# Patient Record
Sex: FEMALE | Race: AMERICAN INDIAN OR ALASKA NATIVE
[De-identification: names, ages, dates, MRNs, and addresses within clinical notes are randomized per-mention and may not be internally consistent; named-entity substitution may affect disease eponyms.]

---

## 2017-09-02 ENCOUNTER — HOSPITAL ENCOUNTER (EMERGENCY)
Dept: HOSPITAL 5 - ED | Age: 57
LOS: 1 days | Discharge: HOME | End: 2017-09-03
Payer: MEDICARE

## 2017-09-02 DIAGNOSIS — R07.9: Primary | ICD-10-CM

## 2017-09-02 DIAGNOSIS — R00.2: ICD-10-CM

## 2017-09-02 DIAGNOSIS — J45.909: ICD-10-CM

## 2017-09-02 DIAGNOSIS — E11.9: ICD-10-CM

## 2017-09-02 DIAGNOSIS — E78.00: ICD-10-CM

## 2017-09-02 LAB
ANION GAP SERPL CALC-SCNC: 19 MMOL/L
BASOPHILS NFR BLD AUTO: 0.4 % (ref 0–1.8)
BUN SERPL-MCNC: 10 MG/DL (ref 7–17)
BUN/CREAT SERPL: 16.66 %
CALCIUM SERPL-MCNC: 9.5 MG/DL (ref 8.4–10.2)
CHLORIDE SERPL-SCNC: 101 MMOL/L (ref 98–107)
CO2 SERPL-SCNC: 29 MMOL/L (ref 22–30)
EOSINOPHIL NFR BLD AUTO: 5 % (ref 0–4.3)
GLUCOSE SERPL-MCNC: 180 MG/DL (ref 65–100)
HCT VFR BLD CALC: 39.4 % (ref 30.3–42.9)
HGB BLD-MCNC: 13 GM/DL (ref 10.1–14.3)
MCH RBC QN AUTO: 29 PG (ref 28–32)
MCHC RBC AUTO-ENTMCNC: 33 % (ref 30–34)
MCV RBC AUTO: 88 FL (ref 79–97)
PLATELET # BLD: 214 K/MM3 (ref 140–440)
POTASSIUM SERPL-SCNC: 4.1 MMOL/L (ref 3.6–5)
RBC # BLD AUTO: 4.47 M/MM3 (ref 3.65–5.03)
SODIUM SERPL-SCNC: 145 MMOL/L (ref 137–145)
WBC # BLD AUTO: 6.3 K/MM3 (ref 4.5–11)

## 2017-09-02 PROCEDURE — 93010 ELECTROCARDIOGRAM REPORT: CPT

## 2017-09-02 PROCEDURE — 84484 ASSAY OF TROPONIN QUANT: CPT

## 2017-09-02 PROCEDURE — 93005 ELECTROCARDIOGRAM TRACING: CPT

## 2017-09-02 PROCEDURE — 80048 BASIC METABOLIC PNL TOTAL CA: CPT

## 2017-09-02 PROCEDURE — 85025 COMPLETE CBC W/AUTO DIFF WBC: CPT

## 2017-09-02 PROCEDURE — 36415 COLL VENOUS BLD VENIPUNCTURE: CPT

## 2017-09-03 VITALS — DIASTOLIC BLOOD PRESSURE: 78 MMHG | SYSTOLIC BLOOD PRESSURE: 114 MMHG

## 2017-09-03 NOTE — EMERGENCY DEPARTMENT REPORT
ED Chest Pain HPI





- General


Chief Complaint: Chest Pain


Stated Complaint: CHEST PAIN


Time Seen by Provider: 09/03/17 00:43


Source: patient


Mode of arrival: Ambulatory


Limitations: No Limitations





- History of Present Illness


Initial Comments: 





Patient is a 47-year-old female past medical history of diabetes controlled by 

diet and hypertension who presents with chest pain and palpitations.  She 

states that her chest pain palpitations started 3 days ago she states that her 

chest pain is a 4 out of 10 nothing really makes it better or worse.  She says 

it's intermittent.  She states she's had chest pain like this before in the 

past.  She is followed by a cardiologist and had a negative stress test.  

Patient states that the pain doesn't radiate and isn't minor pinch-like pain.  

Patient has had no nausea or vomiting with this pain.  She states that she 

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 

her asthma worse.  Patient has never been diagnosed the heart attack has no 

family heart disease.  Patient also does not smoke.


Severity scale (0 -10): 4





- Related Data


 Previous Rx's











 Medication  Instructions  Recorded  Last Taken  Type


 


HYDROcodone/APAP 5-325 [[place] 1 each PO Q6HR PRN #10 tablet 01/22/14 Unknown Rx





5/325 mg]    











 Allergies











Allergy/AdvReac Type Severity Reaction Status Date / Time


 


Penicillins Allergy  Rash Verified 01/22/14 16:28














Heart Score





- HEART Score


History: Slightly suspicious


EKG: Normal


Age: 45-65


Risk factors: 1-2 risk factors


Troponin: < normal limit


HEART Score: 2





ED Review of Systems


ROS: 


Stated complaint: CHEST PAIN


Other details as noted in HPI





Constitutional: denies: chills, fever


Eyes: denies: eye pain, eye discharge, vision change


ENT: denies: ear pain, throat pain


Respiratory: denies: cough, shortness of breath, wheezing


Cardiovascular: chest pain.  denies: palpitations


Endocrine: no symptoms reported


Gastrointestinal: denies: abdominal pain, nausea, diarrhea


Genitourinary: denies: urgency, dysuria, discharge


Musculoskeletal: denies: back pain, joint swelling, arthralgia


Skin: denies: rash, lesions


Neurological: denies: headache, weakness, paresthesias


Psychiatric: denies: anxiety, depression


Hematological/Lymphatic: denies: easy bleeding, easy bruising





ED Past Medical Hx





- Past Medical History


Hx Hypertension: Yes


Hx Diabetes: Yes (diet controlled)


Hx Arthritis: Yes


Hx Asthma: Yes


Additional medical history: hypercholesterolemia.  neuropathy





- Surgical History


Additional Surgical History: bilateral hip replacement.  bilateral carpal 

tunnel release.  cervical fusion





- Social History


Smoking Status: Never Smoker


Substance Use Type: None





- Medications


Home Medications: 


 Home Medications











 Medication  Instructions  Recorded  Confirmed  Last Taken  Type


 


HYDROcodone/APAP 5-325 [[place] 1 each PO Q6HR PRN #10 tablet 01/22/14  Unknown Rx





5/325 mg]     














ED Physical Exam





- General


Limitations: No Limitations


General appearance: alert, in no apparent distress





- Head


Head exam: Present: atraumatic, normocephalic





- Eye


Eye exam: Present: normal appearance





- ENT


ENT exam: Present: mucous membranes moist





- Neck


Neck exam: Present: normal inspection





- Respiratory


Respiratory exam: Present: normal lung sounds bilaterally.  Absent: respiratory 

distress





- Cardiovascular


Cardiovascular Exam: Present: regular rate, normal rhythm.  Absent: systolic 

murmur, diastolic murmur, rubs, gallop





- GI/Abdominal


GI/Abdominal exam: Present: soft, normal bowel sounds





- Extremities Exam


Extremities exam: Present: normal inspection





- Back Exam


Back exam: Present: normal inspection





- Neurological Exam


Neurological exam: Present: alert, oriented X3





- Psychiatric


Psychiatric exam: Present: normal affect, normal mood





- Skin


Skin exam: Present: warm, dry, intact, normal color.  Absent: rash





ED Course


 Vital Signs











  09/02/17 09/03/17





  20:48 02:06


 


Temperature 98.5 F 


 


Pulse Rate 70 74


 


Respiratory 16 16





Rate  


 


Blood Pressure 148/69 


 


Blood Pressure  114/78





[Left]  


 


O2 Sat by Pulse 98 96





Oximetry  














YISEL score





- Yisel Score


Age > 65: (0) No


Aspirin use within the Past 7 Days: (0) No


3 or more CAD Risk Factors: (0) No


2 or more Angina events in past 24 hrs: (0) No


Known CAD with more than 50% Stenosis: (0) No


Elevated Cardiac Markers: (0) No


ST Deviation Greater than 0.5mm: (0) No


YISEL Score: 0





ED Medical Decision Making





- Lab Data


Result diagrams: 


 09/02/17 21:10





 09/02/17 21:10








 Lab Results











  09/02/17 09/02/17 09/02/17 Range/Units





  21:10 21:10 23:20 


 


WBC  6.3    (4.5-11.0)  K/mm3


 


RBC  4.47    (3.65-5.03)  M/mm3


 


Hgb  13.0    (10.1-14.3)  gm/dl


 


Hct  39.4    (30.3-42.9)  %


 


MCV  88    (79-97)  fl


 


MCH  29    (28-32)  pg


 


MCHC  33    (30-34)  %


 


RDW  13.6    (13.2-15.2)  %


 


Plt Count  214    (140-440)  K/mm3


 


Lymph % (Auto)  38.2 H    (13.4-35.0)  %


 


Mono % (Auto)  7.9 H    (0.0-7.3)  %


 


Eos % (Auto)  5.0 H    (0.0-4.3)  %


 


Baso % (Auto)  0.4    (0.0-1.8)  %


 


Lymph #  2.4    (1.2-5.4)  K/mm3


 


Mono #  0.5    (0.0-0.8)  K/mm3


 


Eos #  0.3    (0.0-0.4)  K/mm3


 


Baso #  0.0    (0.0-0.1)  K/mm3


 


Seg Neutrophils %  48.5    (40.0-70.0)  %


 


Seg Neutrophils #  3.1    (1.8-7.7)  K/mm3


 


Sodium   145   (137-145)  mmol/L


 


Potassium   4.1   (3.6-5.0)  mmol/L


 


Chloride   101.0   ()  mmol/L


 


Carbon Dioxide   29   (22-30)  mmol/L


 


Anion Gap   19   mmol/L


 


BUN   10   (7-17)  mg/dL


 


Creatinine   0.6 L   (0.7-1.2)  mg/dL


 


Estimated GFR   > 60   ml/min


 


BUN/Creatinine Ratio   16.66   %


 


Glucose   180 H   ()  mg/dL


 


Calcium   9.5   (8.4-10.2)  mg/dL


 


Troponin T   < 0.010  < 0.010  (0.00-0.029)  ng/mL














- EKG Data


-: EKG Interpreted by Me





- EKG Data





09/03/17 01:51


EKG shows normal sinus rhythm bilaterally atrial [place] no axis deviation no 

ST segment elevations or T-wave inversions.  Compared to prior EKG.





- Medical Decision Making





Chief medical diagnosis: Non-STEMI


Differential medical diagnosis: Costochondritis, metabolic abnormality, 

esophageal spasm, GERD








CBC, CMP, troponin, oral analgesic pain medication, EKG








Patient has had 2 negative troponins cardiac and laboratory markers are 

unrevealing.  EKG is concerning.  Patient having a negative stress test and 

having no active chest pain at this moment patient is low risk and can follow 

up with her out patient cardiologist


Critical care attestation.: 


If time is entered above; I have spent that time in minutes in the direct care 

of this critically ill patient, excluding procedure time.








ED Disposition


Clinical Impression: 


Chest pain


Qualifiers:


 Chest pain type: unspecified Qualified Code(s): R07.9 - Chest pain, unspecified





Disposition: DC-01 TO HOME OR SELFCARE


Is pt being admited?: No


Does the pt Need Aspirin: No


Condition: Stable


Instructions:  Chest Pain (ED)


Referrals: 


TETO BLANCA MD [Primary Care Provider] - 3-5 Days


Time of Disposition: 01:53

## 2020-03-08 ENCOUNTER — HOSPITAL ENCOUNTER (EMERGENCY)
Dept: HOSPITAL 5 - ED | Age: 60
Discharge: HOME | End: 2020-03-08
Payer: MEDICARE

## 2020-03-08 VITALS — DIASTOLIC BLOOD PRESSURE: 69 MMHG | SYSTOLIC BLOOD PRESSURE: 115 MMHG

## 2020-03-08 DIAGNOSIS — H60.92: Primary | ICD-10-CM

## 2020-03-08 DIAGNOSIS — H92.02: ICD-10-CM

## 2020-03-08 DIAGNOSIS — Z88.0: ICD-10-CM

## 2020-03-08 PROCEDURE — 99282 EMERGENCY DEPT VISIT SF MDM: CPT

## 2020-03-08 NOTE — EMERGENCY DEPARTMENT REPORT
Chief Complaint: Earache


Stated Complaint: LFT EAR PAIN


Time Seen by Provider: 03/08/20 15:13





- HPI


History of Present Illness: 





This is a 59 y.o. F. that presents to the ER with left ear pain.





Patient states the back of her tragal piercing fell into her ear and stuck since

awaking.





Patient states she kept rubbing her ear in triage and the back came out.





She still complaining of pain inside of ear.





Denies drainage or tinnitus.





- ROS


Review of Systems: 





ROS: 


Stated complaint: left ear pain


Other details as noted in HPI





Comment: All other systems reviewed and negative








- Exam


Vital Signs: 


                                   Vital Signs











  03/08/20





  15:18


 


Temperature 97.9 F


 


Pulse Rate 64


 


Respiratory 18





Rate 


 


Blood Pressure 115/69


 


O2 Sat by Pulse 99





Oximetry 











Physical Exam: 





- General


Limitations: No Limitations


General appearance: alert, in no apparent distress





- Head


Head exam: Present: atraumatic, normocephalic





- Eye


Eye exam: Present: normal appearance





- ENT


ENT exam: Present: Erythema to left ear canal.  Mucous membranes moist





- Neck


Neck exam: Present: normal inspection, full ROM.  Absent: lymphadenopathy





- Respiratory


Respiratory exam: Present: normal lung sounds bilaterally.  Absent: respiratory 

distress





- Cardiovascular


Cardiovascular Exam: Present: regular rate, normal rhythm.  Absent: systolic 

murmur, diastolic murmur, rubs, gallop





- GI/Abdominal


GI/Abdominal exam: Present: soft, normal bowel sounds





- Extremities Exam


Extremities exam: Present: normal inspection





- Back Exam


Back exam: Present: normal inspection





- Neurological Exam


Neurological exam: Present: alert, oriented X3





- Psychiatric


Psychiatric exam: Present: normal affect, normal mood





- Skin


Skin exam: Present: warm, dry, intact, normal color.  Absent: rash


MSE screening note: 


Focused history and physical exam performed.


Due to findings the following was ordered:











ED Medical Decision Making





- Medical Decision Making





This is a 59-year-old female that presents with left ear pain since awakening. 

Patient is stable and in no acute distress. Vitals normal.  Patient had the back

of the earring lodged in left ear which discharged while in triage.  Reevaluated

left ear with erythematous ear canal.  Patient will be treated for otitis 

externa with otic drops.  Discussed plan with patient and she agreed with plan. 

Discharged home in stable condition.  Given strict return instructions.  Follow 

up with PCP in 24-72 hours.





ED Disposition for MSE


Clinical Impression: 


 Otalgia of left ear





Otitis externa


Qualifiers:


 Otitis externa type: unspecified type Chronicity: acute Laterality: left 

Qualified Code(s): H60.502 - Unspecified acute noninfective otitis externa, left

ear





Disposition: DC-01 TO HOME OR SELFCARE


Is pt being admited?: No


Condition: Stable


Instructions:  Otitis Externa (ED)


Prescriptions: 


Neomy/Polymyx B/Hc Otic Susp [Cortisporin (Otic) Susp] 4 drops OS TID 7 Days #1 

bottle


Referrals: 


[facility] [Provider Group] - 3-5 Days


[facility] [Outside] - 3-5 Days


[facility] [Outside] - 3-5 Days


SHYLA HERRERA MD [Staff Physician] - 3-5 Days


Time of Disposition: 15:20

## 2020-11-12 ENCOUNTER — HOSPITAL ENCOUNTER (EMERGENCY)
Dept: HOSPITAL 5 - ED | Age: 60
Discharge: HOME | End: 2020-11-12
Payer: MEDICARE

## 2020-11-12 VITALS — SYSTOLIC BLOOD PRESSURE: 145 MMHG | DIASTOLIC BLOOD PRESSURE: 72 MMHG

## 2020-11-12 DIAGNOSIS — R61: ICD-10-CM

## 2020-11-12 DIAGNOSIS — Z88.0: ICD-10-CM

## 2020-11-12 DIAGNOSIS — R06.02: ICD-10-CM

## 2020-11-12 DIAGNOSIS — E11.9: ICD-10-CM

## 2020-11-12 DIAGNOSIS — Z79.899: ICD-10-CM

## 2020-11-12 DIAGNOSIS — R07.89: Primary | ICD-10-CM

## 2020-11-12 DIAGNOSIS — J45.909: ICD-10-CM

## 2020-11-12 DIAGNOSIS — M19.91: ICD-10-CM

## 2020-11-12 DIAGNOSIS — Z98.890: ICD-10-CM

## 2020-11-12 DIAGNOSIS — I10: ICD-10-CM

## 2020-11-12 LAB
ALBUMIN SERPL-MCNC: 4.7 G/DL (ref 3.9–5)
ALT SERPL-CCNC: 24 UNITS/L (ref 7–56)
BASOPHILS # (AUTO): 0 K/MM3 (ref 0–0.1)
BASOPHILS NFR BLD AUTO: 0.2 % (ref 0–1.8)
BUN SERPL-MCNC: 18 MG/DL (ref 7–17)
BUN/CREAT SERPL: 26 %
CALCIUM SERPL-MCNC: 10.1 MG/DL (ref 8.4–10.2)
EOSINOPHIL # BLD AUTO: 0.1 K/MM3 (ref 0–0.4)
EOSINOPHIL NFR BLD AUTO: 0.8 % (ref 0–4.3)
HCT VFR BLD CALC: 40.7 % (ref 30.3–42.9)
HEMOLYSIS INDEX: 38
HGB BLD-MCNC: 13.8 GM/DL (ref 10.1–14.3)
LYMPHOCYTES # BLD AUTO: 2.3 K/MM3 (ref 1.2–5.4)
LYMPHOCYTES NFR BLD AUTO: 32.8 % (ref 13.4–35)
MCHC RBC AUTO-ENTMCNC: 34 % (ref 30–34)
MCV RBC AUTO: 88 FL (ref 79–97)
MONOCYTES # (AUTO): 0.7 K/MM3 (ref 0–0.8)
MONOCYTES % (AUTO): 9.9 % (ref 0–7.3)
PLATELET # BLD: 213 K/MM3 (ref 140–440)
RBC # BLD AUTO: 4.63 M/MM3 (ref 3.65–5.03)

## 2020-11-12 PROCEDURE — 84484 ASSAY OF TROPONIN QUANT: CPT

## 2020-11-12 PROCEDURE — 36415 COLL VENOUS BLD VENIPUNCTURE: CPT

## 2020-11-12 PROCEDURE — 80053 COMPREHEN METABOLIC PANEL: CPT

## 2020-11-12 PROCEDURE — 85025 COMPLETE CBC W/AUTO DIFF WBC: CPT

## 2020-11-12 PROCEDURE — 71045 X-RAY EXAM CHEST 1 VIEW: CPT

## 2020-11-12 PROCEDURE — 93005 ELECTROCARDIOGRAM TRACING: CPT

## 2020-11-12 NOTE — EMERGENCY DEPARTMENT REPORT
ED Chest Pain HPI





- General


Chief Complaint: Chest Pain


Stated Complaint: CHEST PAIN


Time Seen by Provider: 11/12/20 14:05


Source: patient, EMS


Mode of arrival: Wheelchair


Limitations: No Limitations





- History of Present Illness


Initial Comments: 





Patient is a 60-year-old F American female past medical history of hypertension 

diet-controlled diabetes hypercholesterolemia who is presenting with chest 

discomfort.  Patient states around 12 noon she had some pressure in the chest 

which was 8 out of 10 in severity lasted approximately 20 minutes.  Patient 

states is now just a soreness in the chest that is very mild.  States she did 

have some shortness of breath initially and some mild diaphoresis but these have

resolved since that time as well.  Patient denies nausea vomiting.  States this 

was center chest.  States there is been no cough cold congestion.  Patient has 

had numerous stress test in the past which were all negative.  Her last was at 

the beginning of 2020 and approximately March when she is had one in 2017.  She 

was cleared by cardiology in both instances.


Severity scale (0 -10): 7





- Related Data


                                  Previous Rx's











 Medication  Instructions  Recorded  Last Taken  Type


 


HYDROcodone/APAP 5-325 [[place] 1 each PO Q6HR PRN #10 tablet 01/22/14 Unknown Rx





5/325 mg]    


 


Neomy/Polymyx B/Hc Otic Susp 4 drops OS TID 7 Days #1 bottle 03/08/20 Unknown Rx





[Cortisporin (Otic) Susp]    


 


Famotidine [Pepcid] 40 mg PO QHS #10 tablet 11/12/20 Unknown Rx











                                    Allergies











Allergy/AdvReac Type Severity Reaction Status Date / Time


 


Penicillins Allergy  Rash Verified 01/22/14 16:28














Heart Score





- HEART Score


History: Slightly suspicious


EKG: Normal


Age: 45-65


Risk factors: > 3 risk factors or hx of atherosclerotic disease


Troponin: < normal limit


HEART Score: 3





ED Review of Systems


ROS: 


Stated complaint: CHEST PAIN


Other details as noted in HPI








ED Past Medical Hx





- Past Medical History


Previous Medical History?: Yes


Hx Hypertension: Yes


Hx Diabetes: Yes (diet controlled)


Hx Arthritis: Yes


Hx Asthma: Yes


Additional medical history: hypercholesterolemia.  neuropathy





- Surgical History


Past Surgical History?: Yes


Additional Surgical History: bilateral hip replacement.  bilateral carpal tunnel

 release.  cervical fusion.  lower lumbar fusion





- Social History


Smoking Status: Never Smoker


Substance Use Type: None





- Medications


Home Medications: 


                                Home Medications











 Medication  Instructions  Recorded  Confirmed  Last Taken  Type


 


HYDROcodone/APAP 5-325 [[place] 1 each PO Q6HR PRN #10 tablet 01/22/14  Unknown Rx





5/325 mg]     


 


Neomy/Polymyx B/Hc Otic Susp 4 drops OS TID 7 Days #1 bottle 03/08/20  Unknown 

Rx





[Cortisporin (Otic) Susp]     


 


Famotidine [Pepcid] 40 mg PO QHS #10 tablet 11/12/20  Unknown Rx














ED Physical Exam





- General


Limitations: No Limitations


General appearance: alert, in no apparent distress





- Head


Head exam: Present: atraumatic, normocephalic





- Eye


Eye exam: Present: normal appearance





- ENT


ENT exam: Present: mucous membranes moist





- Neck


Neck exam: Present: normal inspection





- Respiratory


Respiratory exam: Present: normal lung sounds bilaterally.  Absent: respiratory 

distress, wheezes, rales, rhonchi





- Cardiovascular


Cardiovascular Exam: Present: regular rate, normal rhythm, normal heart sounds. 

 Absent: systolic murmur, diastolic murmur, rubs, gallop





- GI/Abdominal


GI/Abdominal exam: Present: soft, normal bowel sounds





- Extremities Exam


Extremities exam: Present: normal inspection





- Back Exam


Back exam: Present: normal inspection





- Neurological Exam


Neurological exam: Present: alert, oriented X3





- Psychiatric


Psychiatric exam: Present: normal affect, normal mood





- Skin


Skin exam: Present: warm, dry, intact, normal color.  Absent: rash





ED Course





                                   Vital Signs











  11/12/20





  14:03


 


Temperature 98.2 F


 


Pulse Rate 66


 


Respiratory 18





Rate 


 


Blood Pressure 145/72





[Right] 


 


O2 Sat by Pulse 98





Oximetry 














POLI score





- Poli Score


Age > 65: (0) No


Aspirin use within the Past 7 Days: (0) No


3 or more CAD Risk Factors: (0) No


2 or more Angina events in past 24 hrs: (0) No


Known CAD with more than 50% Stenosis: (0) No


Elevated Cardiac Markers: (0) No


ST Deviation Greater than 0.5mm: (0) No


POLI Score: 0





ED Medical Decision Making





- Lab Data


Result diagrams: 


                                 11/12/20 15:09





                                 11/12/20 15:09








                                   Lab Results











  11/12/20 11/12/20 11/12/20 Range/Units





  15:09 15:09 15:09 


 


WBC  7.1    (4.5-11.0)  K/mm3


 


RBC  4.63    (3.65-5.03)  M/mm3


 


Hgb  13.8    (10.1-14.3)  gm/dl


 


Hct  40.7    (30.3-42.9)  %


 


MCV  88    (79-97)  fl


 


MCH  30    (28-32)  pg


 


MCHC  34    (30-34)  %


 


RDW  14.0    (13.2-15.2)  %


 


Plt Count  213    (140-440)  K/mm3


 


Lymph % (Auto)  32.8    (13.4-35.0)  %


 


Mono % (Auto)  9.9 H    (0.0-7.3)  %


 


Eos % (Auto)  0.8    (0.0-4.3)  %


 


Baso % (Auto)  0.2    (0.0-1.8)  %


 


Lymph # (Auto)  2.3    (1.2-5.4)  K/mm3


 


Mono # (Auto)  0.7    (0.0-0.8)  K/mm3


 


Eos # (Auto)  0.1    (0.0-0.4)  K/mm3


 


Baso # (Auto)  0.0    (0.0-0.1)  K/mm3


 


Seg Neutrophils %  56.3    (40.0-70.0)  %


 


Seg Neutrophils #  4.0    (1.8-7.7)  K/mm3


 


Sodium    141  (137-145)  mmol/L


 


Potassium    4.7  (3.6-5.0)  mmol/L


 


Chloride    102.1  ()  mmol/L


 


Carbon Dioxide    30  (22-30)  mmol/L


 


Anion Gap    14  mmol/L


 


BUN    18 H  (7-17)  mg/dL


 


Creatinine    0.7  (0.6-1.2)  mg/dL


 


Estimated GFR    > 60  ml/min


 


BUN/Creatinine Ratio    26  %


 


Glucose    84  ()  mg/dL


 


Calcium    10.1  (8.4-10.2)  mg/dL


 


Total Bilirubin    0.60  (0.1-1.2)  mg/dL


 


AST    18  (5-40)  units/L


 


ALT    24  (7-56)  units/L


 


Alkaline Phosphatase    52  ()  units/L


 


Troponin T   < 0.010   (0.00-0.029)  ng/mL


 


Total Protein    8.1  (6.3-8.2)  g/dL


 


Albumin    4.7  (3.9-5)  g/dL


 


Albumin/Globulin Ratio    1.4  %














  11/12/20 11/12/20 Range/Units





  16:36 19:44 


 


WBC    (4.5-11.0)  K/mm3


 


RBC    (3.65-5.03)  M/mm3


 


Hgb    (10.1-14.3)  gm/dl


 


Hct    (30.3-42.9)  %


 


MCV    (79-97)  fl


 


MCH    (28-32)  pg


 


MCHC    (30-34)  %


 


RDW    (13.2-15.2)  %


 


Plt Count    (140-440)  K/mm3


 


Lymph % (Auto)    (13.4-35.0)  %


 


Mono % (Auto)    (0.0-7.3)  %


 


Eos % (Auto)    (0.0-4.3)  %


 


Baso % (Auto)    (0.0-1.8)  %


 


Lymph # (Auto)    (1.2-5.4)  K/mm3


 


Mono # (Auto)    (0.0-0.8)  K/mm3


 


Eos # (Auto)    (0.0-0.4)  K/mm3


 


Baso # (Auto)    (0.0-0.1)  K/mm3


 


Seg Neutrophils %    (40.0-70.0)  %


 


Seg Neutrophils #    (1.8-7.7)  K/mm3


 


Sodium    (137-145)  mmol/L


 


Potassium    (3.6-5.0)  mmol/L


 


Chloride    ()  mmol/L


 


Carbon Dioxide    (22-30)  mmol/L


 


Anion Gap    mmol/L


 


BUN    (7-17)  mg/dL


 


Creatinine    (0.6-1.2)  mg/dL


 


Estimated GFR    ml/min


 


BUN/Creatinine Ratio    %


 


Glucose    ()  mg/dL


 


Calcium    (8.4-10.2)  mg/dL


 


Total Bilirubin    (0.1-1.2)  mg/dL


 


AST    (5-40)  units/L


 


ALT    (7-56)  units/L


 


Alkaline Phosphatase    ()  units/L


 


Troponin T  < 0.010  < 0.010  (0.00-0.029)  ng/mL


 


Total Protein    (6.3-8.2)  g/dL


 


Albumin    (3.9-5)  g/dL


 


Albumin/Globulin Ratio    %














- EKG Data


-: EKG Interpreted by Me


EKG shows normal: sinus rhythm, axis, intervals, QRS complexes, ST-T waves


Rate: normal





- EKG Data


Interpretation: normal EKG





- Radiology Data





Ordering Physician: ZHENG MCNEIL MD 


 Date of Service: 11/12/20 


 Procedure(s): XR chest 1V ap 


 Accession Number(s): [id number] 





 cc: ED MD EWA 





 Fluoro Time In Minutes: 





 CHEST 1 VIEW 11/12/2020 1:48 PM 





INDICATION / CLINICAL INFORMATION: Chest Pain. 





COMPARISON: None available. 





FINDINGS: 





SUPPORT DEVICES: None. 





HEART / MEDIASTINUM: No significant abnormality. 





LUNGS / PLEURA: No significant pulmonary or pleural abnormality. No pneumo

thorax. 





ADDITIONAL FINDINGS: No significant additional findings. 





IMPRESSION: 


1. No acute findings. 





Signer Name: Miky Anderson MD 


Signed: 11/12/2020 2:52 PM 


Workstation Name: [id number] 





- Medical Decision Making





Patient is a 60-year-old F American female was presented with chest discomfort. 

 Discomfort has been relieved almost completely.  States she still has some 

minor soreness in his chest.  Patient has a low heart score.  She has had 3 - 

troponins at this time I do believe she is stable for discharge.  Patient is to 

take a aspirin daily until she is seen by cardiology.  A referral to [facility]

 [facility] has been sent for the patient.  Patient also be started on 

Pepcid in case this is a gastric issue such as atypical GERD.  Patient be 

discharged home


Critical care attestation.: 


If time is entered above; I have spent that time in minutes in the direct care 

of this critically ill patient, excluding procedure time.








ED Disposition


Clinical Impression: 


 Atypical chest pain





Disposition: DC-01 TO HOME OR SELFCARE


Is pt being admited?: No


Does the pt Need Aspirin: No


Condition: Stable


Instructions:  Chest Pain (ED), Nonspecific Chest Pain, Adult


Referrals: 


CHRISS CRUZ MD [Staff Physician] - 3-5 Days


Time of Disposition: 23:36

## 2020-11-12 NOTE — XRAY REPORT
CHEST 1 VIEW 11/12/2020 1:48 PM



INDICATION / CLINICAL INFORMATION: Chest Pain.



COMPARISON: None available.



FINDINGS:



SUPPORT DEVICES: None.



HEART / MEDIASTINUM: No significant abnormality. 



LUNGS / PLEURA: No significant pulmonary or pleural abnormality. No pneumothorax. 



ADDITIONAL FINDINGS: No significant additional findings.



IMPRESSION:

1. No acute findings.



Signer Name: Miky Anderson MD 

Signed: 11/12/2020 2:52 PM

Workstation Name: [id number]

## 2020-11-12 NOTE — EVENT NOTE
ED Screening Note


Date of service: 11/12/20


Time: 14:06


ED Screening Note: 





Patient complains of substernal chest pain starting around 12 PM today


Denies shortness of breath





This initial assessment/diagnostic orders/clinical plan/treatment(s) is/are 

subject to change based on patients health status, clinical progression and re-

assessment by fellow clinical providers in the ED. Further treatment and workup 

at subsequent clinical providers discretion. Patient/guardian urged not to elope

from the ED as their condition may be serious if not clinically assessed and 

managed. 





Initial orders include: 


Labs


Chest x-ray


EKG

## 2022-09-22 ENCOUNTER — HOSPITAL ENCOUNTER (EMERGENCY)
Dept: HOSPITAL 5 - ED | Age: 62
LOS: 1 days | Discharge: LEFT BEFORE DISCHARGE | End: 2022-09-23
Payer: MEDICARE

## 2022-09-22 DIAGNOSIS — Z53.21: ICD-10-CM

## 2022-09-22 DIAGNOSIS — M54.2: Primary | ICD-10-CM

## 2022-09-23 ENCOUNTER — HOSPITAL ENCOUNTER (EMERGENCY)
Dept: HOSPITAL 5 - ED | Age: 62
Discharge: HOME | End: 2022-09-23
Payer: MEDICARE

## 2022-09-23 VITALS — DIASTOLIC BLOOD PRESSURE: 70 MMHG | SYSTOLIC BLOOD PRESSURE: 102 MMHG

## 2022-09-23 DIAGNOSIS — E11.9: ICD-10-CM

## 2022-09-23 DIAGNOSIS — M54.2: Primary | ICD-10-CM

## 2022-09-23 DIAGNOSIS — J45.909: ICD-10-CM

## 2022-09-23 DIAGNOSIS — Z88.1: ICD-10-CM

## 2022-09-23 DIAGNOSIS — M19.90: ICD-10-CM

## 2022-09-23 DIAGNOSIS — I10: ICD-10-CM

## 2022-09-23 PROCEDURE — 72125 CT NECK SPINE W/O DYE: CPT

## 2022-09-23 PROCEDURE — 99283 EMERGENCY DEPT VISIT LOW MDM: CPT

## 2022-09-23 PROCEDURE — 96372 THER/PROPH/DIAG INJ SC/IM: CPT

## 2022-09-23 NOTE — EMERGENCY DEPARTMENT REPORT
ED Neck Pain/Injury HPI





- General


Chief Complaint: Neck Pain/Injury


Stated Complaint: NECK PAIN


Time Seen by Provider: 22 12:56


Mode of arrival: Ambulatory


Limitations: No Limitations





- History of Present Illness


Initial Comments: 





Patient is a 62-year-old female status post neck surgery x3 all this year 

(January, March, ) at [facility] by Dr. Heredia.  She states that 

for the last 4 days she has had increasing pain and states "it does not feel 

right."  She is currently on Norco which she has not taken since last night, 

Lyrica, diclofenac p.o. and topical.  She denies any paresthesias or weakness.  

No chest pain or shortness of breath.


Associated Symptoms: denies: headache, fever, numbness, tingling, weakness, 

vertigo, difficulty walking, swollen glands, difficulty swallowing, nausea, 

vomiting


Treatments Prior to Arrival: none





- Related Data


                                  Previous Rx's











 Medication  Instructions  Recorded  Last Taken  Type


 


HYDROcodone/APAP 5-325 [[place] 1 each PO Q6HR PRN #10 tablet 14 Unknown Rx





5/325 mg]    


 


Neomy/Polymyx B/Hc Otic Susp 4 drops OS TID 7 Days #1 bottle 20 Unknown Rx





[Cortisporin (Otic) Susp]    


 


Famotidine [Pepcid] 40 mg PO QHS #10 tablet 20 Unknown Rx











                                    Allergies











Allergy/AdvReac Type Severity Reaction Status Date / Time


 


Penicillins Allergy  Rash Verified 22 08:00














ED Review of Systems


ROS: 


Stated complaint: NECK PAIN


Other details as noted in HPI





Comment: All other systems reviewed and negative


Constitutional: denies: chills, fever


Eyes: denies: eye pain, eye discharge, vision change


ENT: denies: ear pain, throat pain


Respiratory: denies: cough, shortness of breath, wheezing


Cardiovascular: denies: chest pain, palpitations


Endocrine: no symptoms reported


Gastrointestinal: denies: abdominal pain, nausea, diarrhea


Genitourinary: denies: urgency, dysuria, discharge


Musculoskeletal: as per HPI


Skin: denies: rash, lesions


Neurological: denies: headache, weakness, numbness, paresthesias


Psychiatric: denies: anxiety, depression


Hematological/Lymphatic: denies: easy bleeding, easy bruising





ED Past Medical Hx





- Past Medical History


Previous Medical History?: Yes


Hx Hypertension: Yes


Hx Diabetes: Yes (diet controlled)


Hx Arthritis: Yes


Hx Asthma: Yes


Additional medical history: hypercholesterolemia.  neuropathy





- Surgical History


Additional Surgical History: bilateral hip replacement.  bilateral carpal tunnel

 release.  cervical fusion.  lower lumbar fusion





- Social History


Smoking Status: Never Smoker


Substance Use Type: None





- Medications


Home Medications: 


                                Home Medications











 Medication  Instructions  Recorded  Confirmed  Last Taken  Type


 


HYDROcodone/APAP 5-325 [[place] 1 each PO Q6HR PRN #10 tablet 14  Unknown Rx





5/325 mg]     


 


Neomy/Polymyx B/Hc Otic Susp 4 drops OS TID 7 Days #1 bottle 20  Unknown 

Rx





[Cortisporin (Otic) Susp]     


 


Famotidine [Pepcid] 40 mg PO QHS #10 tablet 20  Unknown Rx














ED Physical Exam





- General


Limitations: No Limitations


General appearance: alert, in no apparent distress





- Head


Head exam: Present: atraumatic, normocephalic





- Eye


Eye exam: Present: normal appearance





- ENT


ENT exam: Present: mucous membranes moist





- Neck


Neck exam: Present: normal inspection, tenderness (Paraspinous muscle left 

greater than right.), full ROM (Decreased all modalities).  Absent: meningismus,

 lymphadenopathy





- Respiratory


Respiratory exam: Present: normal lung sounds bilaterally.  Absent: respiratory 

distress





- Cardiovascular


Cardiovascular Exam: Present: regular rate, normal rhythm.  Absent: systolic 

murmur, diastolic murmur, rubs, gallop





- GI/Abdominal


GI/Abdominal exam: Present: soft, normal bowel sounds





- Extremities Exam


Extremities exam: Present: normal inspection, full ROM, normal capillary refill.

  Absent: tenderness





- Back Exam


Back exam: Present: normal inspection





- Neurological Exam


Neurological exam: Present: alert, oriented X3, CN II-XII intact, reflexes 

normal.  Absent: motor sensory deficit





- Expanded Neurological Exam


  ** Expanded


Sensory exam: Upper Extremity Light Touch: Normal, Upper Extremity Pin Prick: 

Normal, Upper Extremity Temperature: Normal, UE 2 Point Discrimination: Normal


Motor strength exam: RUE: 5


DTR: bicep (R): 3+, bicep (L): 3+, tricep (R): 3+, tricep (L): 3+





- Psychiatric


Psychiatric exam: Present: normal affect, normal mood





- Skin


Skin exam: Present: warm, dry, intact, normal color.  Absent: rash





ED Course


                                   Vital Signs











  22





  07:57


 


Temperature 98.8 F


 


Pulse Rate 74


 


Respiratory 14





Rate 


 


Blood Pressure 106/57


 


O2 Sat by Pulse 99





Oximetry 














- Reevaluation(s)


Reevaluation #1: 





22 16:28


Patient's pain improved





ED Medical Decision Making





- Radiology Data


Radiology results: report reviewed





[facility]  


                                     [address] [place] [address]  


 


                                          Cat Scan Report   


                                               Signed  


 


Patient: CLINT MAHAN                                                

                MR#:  


 [id number]          


: 1960                                                                

Acct:[id number]      


 


Age/Sex: 62 / F                                                                

ADM Date: 22     


 


Loc: ED       


Attending Dr:   


 


 


Ordering Physician: NICKI CHUN  


Date of Service: 22  


Procedure(s): CT cervical spine wo con  


Accession Number(s): [id number]  


 


cc: NICKI CHUN   


 


 


CT CERVICAL SPINE WITHOUT CONTRAST  


 


 INDICATION / CLINICAL INFORMATION:  


 neck pain - s/p OR x 3 since 2022.  


 


 TECHNIQUE:  


 Axial CT images were obtained through the cervical spine. Sagittal and coronal 

reformatted images 


were produced. All CT scans at this location are performed using CT dose 

reduction for ALARA by 


means of automated exposure control.   


 


 COMPARISON:  


 None available.  


 


 FINDINGS:  


 


 POSTOPERATIVE CHANGE: Status post ACDF C4-5 and C5-6 levels. There may be bone 

union along the 


lateral margins of the C4-C5 intervertebral disc. Bone union is indeterminate at

 the C5-6 level.  


The C4 vertebral body screws penetrate the superior cortex. The right-sided C4 

vertebral body screw 


is surrounded by radiolucent halo.  


 


 ALIGNMENT: No significant abnormality.  


 


 VERTEBRAE: Postoperative changes as described. There is partial resorption of 

the superior endplate


of C4. Anterior osteophyte formation is noted at the C6-7 level.  


 


 DISC SPACES: Loss of disc height is noted at the C6-7 and C7-T1 levels.  


 


 INDIVIDUAL LEVEL ANALYSIS:  


 


 C2-3: Small anterior osteophytes are noted. No additional abnormality. No 

abnormality.  


 


 C3-4: Small anterior osteophytes are noted. Left-sided facet arthritic changes 

are noted. There is 


erosion of the superior endplate of C4 perhaps related to penetration of the 

superior endplates of 


C4 by the C4 vertebral body screws.  


 


 C4-5: Status post ACDF with suggestion of bone union across the lateral aspect 

of the 


intervertebral disc bilaterally. Based on the appearance of the coronal rec

onstructions.  


 


 C5-6: Status post ACDF. No indication of bone union. Central spinal canal is 

adequate in size. Mild


bilateral neuroforaminal narrowing is observed.  


 


 C6-7: Anterior osteophyte formation is noted. No indication of disc herniation,

 central canal 


stenosis or neuroforaminal narrowing.  


 


 C7-T1: No abnormality.  


 


 


 CRANIOCERVICAL JUNCTION:No significant abnormality.  


 


 SPINAL CANAL: Central spinal canal is adequately maintained throughout.  


 


 PARASPINAL SOFT TISSUES: No significant abnormality.  


 


 LUNG APICES: No indication of lung nodule or confluent infiltrate.  


 


 IMPRESSION:  


 1. Postoperative changes status post ACDF C4-5 and C5-6. Findings suggest bone 

union across the C4-


5 level. Rib  


 2. No indication of central canal stenosis or significant foraminal narrowing. 

 


 


 


 Signer Name: Cristhian Barajas MD   


 Signed: 2022 3:50 PM  


 Workstation Name: [id number]   


 


 


Transcribed By: AS  


Dictated By: Cristhian Barajas MD  


Electronically Authenticated By: Cristhian Barajas MD    


Signed Date/Time: 22 1550                                


 


 


 


DD/DT: 22 1541                                                            

  


TD/TT:


Print Cancel





This report was discussed with Dr. Barajas who states that the word rib was placed

 inadvertently.  He also clarified that the halo lucency around C4 was moot 

because there was good union/fusion at that level.





- Medical Decision Making





This is a 62-year-old female with history of cervical fusion x3 in the last 10 

months who presents with neck pain for the last 4 days that is a little 

different from previous.  CT report reveals no concerning findings per 

discussion with Dr. Jose Rafael Barajas.  Patient was given medications here and feels 

significantly better but she has medications from her pain management physician 

as well as a pain contract.  Recommended that she continue those and follow-up 

with pain management and Ortho on Monday.


Critical care attestation.: 


If time is entered above; I have spent that time in minutes in the direct care 

of this critically ill patient, excluding procedure time.








ED Disposition


Clinical Impression: 


 Chronic neck pain with history of cervical spinal surgery





Disposition:  HOME / SELF CARE / HOMELESS


Is pt being admited?: No


Condition: Stable


Instructions:  Cervical Sprain


Additional Instructions: 


Ice alternating with heat.  Follow-up with your Ortho on Monday.  Follow-up with

 your pain management doctor.  Return if any worsening/concerning symptoms such 

as weakness or increasing numbness.


Forms:  Work/School Release Form(ED)


Time of Disposition: 17:07

## 2022-09-23 NOTE — CAT SCAN REPORT
CT CERVICAL SPINE WITHOUT CONTRAST



INDICATION / CLINICAL INFORMATION:

neck pain - s/p OR x 3 since jan 2022.



TECHNIQUE:

Axial CT images were obtained through the cervical spine. Sagittal and coronal reformatted images wer
e produced. All CT scans at this location are performed using CT dose reduction for ALARA by means of
 automated exposure control. 



COMPARISON:

None available.



FINDINGS:



POSTOPERATIVE CHANGE: Status post ACDF C4-5 and C5-6 levels. There may be bone union along the latera
l margins of the C4-C5 intervertebral disc. Bone union is indeterminate at the C5-6 level.  The C4 ve
rtebral body screws penetrate the superior cortex. The right-sided C4 vertebral body screw is surroun
ded by radiolucent halo.



ALIGNMENT: No significant abnormality.



VERTEBRAE: Postoperative changes as described. There is partial resorption of the superior endplate o
f C4. Anterior osteophyte formation is noted at the C6-7 level.



DISC SPACES: Loss of disc height is noted at the C6-7 and C7-T1 levels.



INDIVIDUAL LEVEL ANALYSIS:



C2-3: Small anterior osteophytes are noted. No additional abnormality. No abnormality.



C3-4: Small anterior osteophytes are noted. Left-sided facet arthritic changes are noted. There is er
osion of the superior endplate of C4 perhaps related to penetration of the superior endplates of C4 b
y the C4 vertebral body screws.



C4-5: Status post ACDF with suggestion of bone union across the lateral aspect of the intervertebral 
disc bilaterally. Based on the appearance of the coronal reconstructions.



C5-6: Status post ACDF. No indication of bone union. Central spinal canal is adequate in size. Mild b
ilateral neuroforaminal narrowing is observed.



C6-7: Anterior osteophyte formation is noted. No indication of disc herniation, central canal stenosi
s or neuroforaminal narrowing.



C7-T1: No abnormality.





CRANIOCERVICAL JUNCTION:No significant abnormality.



SPINAL CANAL: Central spinal canal is adequately maintained throughout.



PARASPINAL SOFT TISSUES: No significant abnormality.



LUNG APICES: No indication of lung nodule or confluent infiltrate.



IMPRESSION:

1. Postoperative changes status post ACDF C4-5 and C5-6. Findings suggest bone union across the C4-5 
level. Rib

2. No indication of central canal stenosis or significant foraminal narrowing.





Signer Name: Cristhian Barajas MD 

Signed: 9/23/2022 3:50 PM

Workstation Name: [id number]